# Patient Record
Sex: FEMALE | Race: WHITE
[De-identification: names, ages, dates, MRNs, and addresses within clinical notes are randomized per-mention and may not be internally consistent; named-entity substitution may affect disease eponyms.]

---

## 2019-08-16 ENCOUNTER — HOSPITAL ENCOUNTER (EMERGENCY)
Dept: HOSPITAL 35 - ER | Age: 54
LOS: 1 days | Discharge: HOME | End: 2019-08-17
Payer: COMMERCIAL

## 2019-08-16 VITALS — BODY MASS INDEX: 24.99 KG/M2 | WEIGHT: 150 LBS | HEIGHT: 65 IN

## 2019-08-16 DIAGNOSIS — Z88.6: ICD-10-CM

## 2019-08-16 DIAGNOSIS — E03.9: ICD-10-CM

## 2019-08-16 DIAGNOSIS — I10: ICD-10-CM

## 2019-08-16 DIAGNOSIS — I50.33: ICD-10-CM

## 2019-08-16 DIAGNOSIS — Z88.1: ICD-10-CM

## 2019-08-16 DIAGNOSIS — N39.0: Primary | ICD-10-CM

## 2019-08-16 DIAGNOSIS — Z91.030: ICD-10-CM

## 2019-08-16 DIAGNOSIS — Z88.2: ICD-10-CM

## 2019-08-16 DIAGNOSIS — F03.90: ICD-10-CM

## 2019-08-16 LAB
ANION GAP SERPL CALC-SCNC: 11 MMOL/L (ref 7–16)
BACTERIA-REFLEX: (no result) /HPF
BASOPHILS NFR BLD AUTO: 1.5 % (ref 0–2)
BILIRUB UR-MCNC: NEGATIVE MG/DL
BUN SERPL-MCNC: 20 MG/DL (ref 7–18)
CALCIUM OXALATE: (no result) /LPF
CALCIUM SERPL-MCNC: 9.6 MG/DL (ref 8.5–10.1)
CHLORIDE SERPL-SCNC: 104 MMOL/L (ref 98–107)
CO2 SERPL-SCNC: 28 MMOL/L (ref 21–32)
COLOR UR: YELLOW
CREAT SERPL-MCNC: 1.1 MG/DL (ref 0.6–1)
EOSINOPHIL NFR BLD: 1.5 % (ref 0–3)
ERYTHROCYTE [DISTWIDTH] IN BLOOD BY AUTOMATED COUNT: 15.4 % (ref 10.5–14.5)
GLUCOSE SERPL-MCNC: 107 MG/DL (ref 74–106)
GRANULOCYTES NFR BLD MANUAL: 58.6 % (ref 36–66)
HCT VFR BLD CALC: 32.7 % (ref 37–47)
HGB BLD-MCNC: 10.7 GM/DL (ref 12–15)
HYALINE CASTS #/AREA URNS LPF: (no result) /LPF
KETONES UR STRIP-MCNC: NEGATIVE MG/DL
LYMPHOCYTES NFR BLD AUTO: 29.9 % (ref 24–44)
MCH RBC QN AUTO: 28.3 PG (ref 26–34)
MCHC RBC AUTO-ENTMCNC: 32.8 G/DL (ref 28–37)
MCV RBC: 86.2 FL (ref 80–100)
MONOCYTES NFR BLD: 8.5 % (ref 1–8)
MUCUS: (no result) STRN/LPF
NEUTROPHILS # BLD: 5.4 THOU/UL (ref 1.4–8.2)
PLATELET # BLD: 351 THOU/UL (ref 150–400)
POTASSIUM SERPL-SCNC: 4.1 MMOL/L (ref 3.5–5.1)
RBC # BLD AUTO: 3.79 MIL/UL (ref 4.2–5)
RBC # UR STRIP: NEGATIVE /UL
RBC #/AREA URNS HPF: (no result) /HPF (ref 0–2)
SODIUM SERPL-SCNC: 143 MMOL/L (ref 136–145)
SP GR UR STRIP: 1.02 (ref 1–1.03)
SQUAMOUS: (no result) /LPF (ref 0–3)
TROPONIN I SERPL-MCNC: <0.06 NG/ML (ref ?–0.06)
URINE CLARITY: CLEAR
URINE GLUCOSE-RANDOM*: NEGATIVE
URINE LEUKOCYTES-REFLEX: NEGATIVE
URINE NITRITE-REFLEX: POSITIVE
URINE PROTEIN (DIPSTICK): NEGATIVE
URINE WBC-REFLEX: (no result) /HPF (ref 0–5)
UROBILINOGEN UR STRIP-ACNC: 0.2 E.U./DL (ref 0.2–1)
WBC # BLD AUTO: 9.2 THOU/UL (ref 4–11)

## 2019-08-17 VITALS — SYSTOLIC BLOOD PRESSURE: 82 MMHG | DIASTOLIC BLOOD PRESSURE: 52 MMHG

## 2019-08-17 NOTE — EKG
[place] [address] [facility] [employer]
[place]  [address]
Phone:  [phone]                    ELECTROCARDIOGRAM REPORT      
_______________________________________________________________________________
 
Name:       MONISHA MURRIETA                 Room #:                     [facility] BRANNON MIKE#:      [id number]     Account #:      [id number]  
Admission:  19    Attend Phys:                          
Discharge:  19    Date of Birth:  [birth year]  
                                                          Report #: [id number]
   [id number]
_______________________________________________________________________________
THIS REPORT FOR:   //name//                          
 
                         [facility] ED
                                       
Test Date:    2019               Test Time:    22:22:05
Pat Name:     MONISHA MURRIETA            Department:   
Patient ID:   [id number]            Room:          
Gender:       F                        Technician:   
:          1965               Requested By: Bret Bullock
Order Number: [id number] MD:   Ezequiel Valdez
                                 Measurements
Intervals                              Axis          
Rate:         80                       P:            46
NC:           145                      QRS:          50
QRSD:         96                       T:            43
QT:           380                                    
QTc:          439                                    
                           Interpretive Statements
Sinus rhythm
Normal tracing
No previous ECG available for comparison
 
Electronically Signed On 2019 10:41:42 CDT by Ezequiel Valdez
[email or web address]
 
 
 
 
 
 
 
 
 
 
 
 
 
 
 
 
 
 
 
  <ELECTRONICALLY SIGNED>
   By: Ezequiel Valdez MD, [facility]   
  19     1041
D: 19 2222                           _____________________________________
T: 19 2222                           Ezequiel Valdez MD, FACC     /EPI